# Patient Record
Sex: MALE | Race: BLACK OR AFRICAN AMERICAN | NOT HISPANIC OR LATINO | URBAN - METROPOLITAN AREA
[De-identification: names, ages, dates, MRNs, and addresses within clinical notes are randomized per-mention and may not be internally consistent; named-entity substitution may affect disease eponyms.]

---

## 2017-12-13 ENCOUNTER — EMERGENCY (EMERGENCY)
Facility: HOSPITAL | Age: 27
LOS: 1 days | Discharge: HOME | End: 2017-12-13
Attending: EMERGENCY MEDICINE | Admitting: EMERGENCY MEDICINE
Payer: COMMERCIAL

## 2017-12-13 VITALS
SYSTOLIC BLOOD PRESSURE: 121 MMHG | OXYGEN SATURATION: 100 % | RESPIRATION RATE: 16 BRPM | DIASTOLIC BLOOD PRESSURE: 77 MMHG | HEART RATE: 90 BPM | TEMPERATURE: 99 F

## 2017-12-13 PROCEDURE — 99283 EMERGENCY DEPT VISIT LOW MDM: CPT

## 2017-12-13 RX ORDER — TRAMADOL HYDROCHLORIDE 50 MG/1
1 TABLET ORAL
Qty: 9 | Refills: 0 | OUTPATIENT
Start: 2017-12-13

## 2017-12-13 RX ORDER — TRAMADOL HYDROCHLORIDE 50 MG/1
50 TABLET ORAL ONCE
Qty: 0 | Refills: 0 | Status: DISCONTINUED | OUTPATIENT
Start: 2017-12-13 | End: 2017-12-13

## 2017-12-13 RX ORDER — TRAMADOL HYDROCHLORIDE 50 MG/1
25 TABLET ORAL ONCE
Qty: 0 | Refills: 0 | Status: DISCONTINUED | OUTPATIENT
Start: 2017-12-13 | End: 2017-12-13

## 2017-12-13 RX ORDER — TRAMADOL HYDROCHLORIDE 50 MG/1
1 TABLET ORAL
Qty: 9 | Refills: 0 | OUTPATIENT
Start: 2017-12-13 | End: 2017-12-15

## 2017-12-13 RX ADMIN — TRAMADOL HYDROCHLORIDE 50 MILLIGRAM(S): 50 TABLET ORAL at 12:35

## 2017-12-13 NOTE — ED PROVIDER NOTE - LOWER EXTREMITY EXAM, RIGHT
mild tenderness to right calf, minimal discoloration indicating bruise, no obvious swelling mild tenderness to right calf, minimal discoloration indicating bruise, no obvious swelling. No erythema. No deformity. Right foot with no tenderness or deformity.

## 2017-12-13 NOTE — ED PROVIDER NOTE - CARE PLAN
Principal Discharge DX:	Musculoskeletal pain  Instructions for follow-up, activity and diet:	Follow up with your Doctor in 1-2 days.  Rest.  Ice 3-4 x a day.  Continue to take Naproxen as directed for pain take with food.  Ice to affected area 3-4 days.    Return to the ER for any persistent/worsening or new symptoms weakness, numbness or any concerning symptoms. Principal Discharge DX:	Musculoskeletal pain  Instructions for follow-up, activity and diet:	Follow up with your Doctor in 1-2 days.  Rest.  Ice 3-4 x a day.  Continue to take Naproxen as directed for pain take with food.  Take Tramadol 50mg orally every 8 hours as needed for pain don't drive or drink alcohol while taking this medication.  Ice to affected area 3-4 days.    Return to the ER for any persistent/worsening or new symptoms weakness, numbness or any concerning symptoms.

## 2017-12-13 NOTE — ED PROVIDER NOTE - PROGRESS NOTE DETAILS
DAIJA Choudhary:(QUID PA) pt ambulating without difficulty, no limp.  Pt requesting percocet RX advised receiving rx for Tramadol pt cursing and yelling, pt advised he is not receiving percocet,  discharged reviewed with patient.  D/W Dr Do. DAIJA Choudhary: pt escorted out by security.

## 2017-12-13 NOTE — ED ADULT TRIAGE NOTE - CHIEF COMPLAINT QUOTE
Pt c/o RLE pain since last night.  Was seen at [place] yesterday.  Amb well in triage.  Pt belligerent, uncooperative, and using foul language in triage.  Poor historian

## 2017-12-13 NOTE — ED PROVIDER NOTE - PLAN OF CARE
Follow up with your Doctor in 1-2 days.  Rest.  Ice 3-4 x a day.  Continue to take Naproxen as directed for pain take with food.  Ice to affected area 3-4 days.    Return to the ER for any persistent/worsening or new symptoms weakness, numbness or any concerning symptoms. Follow up with your Doctor in 1-2 days.  Rest.  Ice 3-4 x a day.  Continue to take Naproxen as directed for pain take with food.  Take Tramadol 50mg orally every 8 hours as needed for pain don't drive or drink alcohol while taking this medication.  Ice to affected area 3-4 days.    Return to the ER for any persistent/worsening or new symptoms weakness, numbness or any concerning symptoms.
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
## 2018-07-23 ENCOUNTER — EMERGENCY (EMERGENCY)
Facility: HOSPITAL | Age: 28
LOS: 1 days | Discharge: HOME | End: 2018-07-23
Attending: EMERGENCY MEDICINE | Admitting: EMERGENCY MEDICINE
Payer: SELF-PAY

## 2018-07-23 VITALS
DIASTOLIC BLOOD PRESSURE: 70 MMHG | RESPIRATION RATE: 16 BRPM | SYSTOLIC BLOOD PRESSURE: 104 MMHG | HEART RATE: 94 BPM | OXYGEN SATURATION: 100 % | TEMPERATURE: 98 F

## 2018-07-23 VITALS
SYSTOLIC BLOOD PRESSURE: 108 MMHG | OXYGEN SATURATION: 100 % | HEART RATE: 72 BPM | DIASTOLIC BLOOD PRESSURE: 56 MMHG | RESPIRATION RATE: 14 BRPM

## 2018-07-23 LAB
ALBUMIN SERPL ELPH-MCNC: 3.9 G/DL — SIGNIFICANT CHANGE UP (ref 3.3–5)
ALP SERPL-CCNC: 67 U/L — SIGNIFICANT CHANGE UP (ref 40–120)
ALT FLD-CCNC: 20 U/L — SIGNIFICANT CHANGE UP (ref 4–41)
AST SERPL-CCNC: 20 U/L — SIGNIFICANT CHANGE UP (ref 4–40)
B PERT DNA SPEC QL NAA+PROBE: SIGNIFICANT CHANGE UP
BASE EXCESS BLDV CALC-SCNC: 1 MMOL/L — SIGNIFICANT CHANGE UP
BASOPHILS # BLD AUTO: 0.03 K/UL — SIGNIFICANT CHANGE UP (ref 0–0.2)
BASOPHILS NFR BLD AUTO: 0.3 % — SIGNIFICANT CHANGE UP (ref 0–2)
BILIRUB SERPL-MCNC: 1 MG/DL — SIGNIFICANT CHANGE UP (ref 0.2–1.2)
BLOOD GAS VENOUS - CREATININE: 0.94 MG/DL — SIGNIFICANT CHANGE UP (ref 0.5–1.3)
BUN SERPL-MCNC: 12 MG/DL — SIGNIFICANT CHANGE UP (ref 7–23)
C PNEUM DNA SPEC QL NAA+PROBE: NOT DETECTED — SIGNIFICANT CHANGE UP
CALCIUM SERPL-MCNC: 8.9 MG/DL — SIGNIFICANT CHANGE UP (ref 8.4–10.5)
CHLORIDE BLDV-SCNC: 107 MMOL/L — SIGNIFICANT CHANGE UP (ref 96–108)
CHLORIDE SERPL-SCNC: 101 MMOL/L — SIGNIFICANT CHANGE UP (ref 98–107)
CK SERPL-CCNC: 534 U/L — HIGH (ref 30–200)
CK SERPL-CCNC: 684 U/L — HIGH (ref 30–200)
CO2 SERPL-SCNC: 25 MMOL/L — SIGNIFICANT CHANGE UP (ref 22–31)
CREAT SERPL-MCNC: 1 MG/DL — SIGNIFICANT CHANGE UP (ref 0.5–1.3)
CRP SERPL-MCNC: 57.9 MG/L — HIGH
EOSINOPHIL # BLD AUTO: 0.13 K/UL — SIGNIFICANT CHANGE UP (ref 0–0.5)
EOSINOPHIL NFR BLD AUTO: 1.3 % — SIGNIFICANT CHANGE UP (ref 0–6)
ERYTHROCYTE [SEDIMENTATION RATE] IN BLOOD: 23 MM/HR — HIGH (ref 1–15)
FLUAV H1 2009 PAND RNA SPEC QL NAA+PROBE: NOT DETECTED — SIGNIFICANT CHANGE UP
FLUAV H1 RNA SPEC QL NAA+PROBE: NOT DETECTED — SIGNIFICANT CHANGE UP
FLUAV H3 RNA SPEC QL NAA+PROBE: NOT DETECTED — SIGNIFICANT CHANGE UP
FLUAV SUBTYP SPEC NAA+PROBE: SIGNIFICANT CHANGE UP
FLUBV RNA SPEC QL NAA+PROBE: NOT DETECTED — SIGNIFICANT CHANGE UP
GAS PNL BLDV: 139 MMOL/L — SIGNIFICANT CHANGE UP (ref 136–146)
GLUCOSE BLDV-MCNC: 108 — HIGH (ref 70–99)
GLUCOSE SERPL-MCNC: 107 MG/DL — HIGH (ref 70–99)
HADV DNA SPEC QL NAA+PROBE: NOT DETECTED — SIGNIFICANT CHANGE UP
HBA1C BLD-MCNC: 5.8 % — HIGH (ref 4–5.6)
HCO3 BLDV-SCNC: 25 MMOL/L — SIGNIFICANT CHANGE UP (ref 20–27)
HCOV 229E RNA SPEC QL NAA+PROBE: NOT DETECTED — SIGNIFICANT CHANGE UP
HCOV HKU1 RNA SPEC QL NAA+PROBE: NOT DETECTED — SIGNIFICANT CHANGE UP
HCOV NL63 RNA SPEC QL NAA+PROBE: NOT DETECTED — SIGNIFICANT CHANGE UP
HCOV OC43 RNA SPEC QL NAA+PROBE: NOT DETECTED — SIGNIFICANT CHANGE UP
HCT VFR BLD CALC: 31.7 % — LOW (ref 39–50)
HCT VFR BLD CALC: 33.8 % — LOW (ref 39–50)
HCT VFR BLDV CALC: 35.6 % — LOW (ref 39–51)
HGB BLD-MCNC: 10.3 G/DL — LOW (ref 13–17)
HGB BLD-MCNC: 11.1 G/DL — LOW (ref 13–17)
HGB BLDV-MCNC: 11.5 G/DL — LOW (ref 13–17)
HMPV RNA SPEC QL NAA+PROBE: NOT DETECTED — SIGNIFICANT CHANGE UP
HPIV1 RNA SPEC QL NAA+PROBE: NOT DETECTED — SIGNIFICANT CHANGE UP
HPIV2 RNA SPEC QL NAA+PROBE: NOT DETECTED — SIGNIFICANT CHANGE UP
HPIV3 RNA SPEC QL NAA+PROBE: NOT DETECTED — SIGNIFICANT CHANGE UP
HPIV4 RNA SPEC QL NAA+PROBE: NOT DETECTED — SIGNIFICANT CHANGE UP
IMM GRANULOCYTES # BLD AUTO: 0.03 # — SIGNIFICANT CHANGE UP
IMM GRANULOCYTES NFR BLD AUTO: 0.3 % — SIGNIFICANT CHANGE UP (ref 0–1.5)
LACTATE BLDV-MCNC: 1.8 MMOL/L — SIGNIFICANT CHANGE UP (ref 0.5–2)
LDH SERPL L TO P-CCNC: 181 U/L — SIGNIFICANT CHANGE UP (ref 135–225)
LYMPHOCYTES # BLD AUTO: 2.27 K/UL — SIGNIFICANT CHANGE UP (ref 1–3.3)
LYMPHOCYTES # BLD AUTO: 23.5 % — SIGNIFICANT CHANGE UP (ref 13–44)
M PNEUMO DNA SPEC QL NAA+PROBE: NOT DETECTED — SIGNIFICANT CHANGE UP
MAGNESIUM SERPL-MCNC: 2 MG/DL — SIGNIFICANT CHANGE UP (ref 1.6–2.6)
MCHC RBC-ENTMCNC: 27.8 PG — SIGNIFICANT CHANGE UP (ref 27–34)
MCHC RBC-ENTMCNC: 28 PG — SIGNIFICANT CHANGE UP (ref 27–34)
MCHC RBC-ENTMCNC: 32.5 % — SIGNIFICANT CHANGE UP (ref 32–36)
MCHC RBC-ENTMCNC: 32.8 % — SIGNIFICANT CHANGE UP (ref 32–36)
MCV RBC AUTO: 84.7 FL — SIGNIFICANT CHANGE UP (ref 80–100)
MCV RBC AUTO: 86.1 FL — SIGNIFICANT CHANGE UP (ref 80–100)
MONOCYTES # BLD AUTO: 0.78 K/UL — SIGNIFICANT CHANGE UP (ref 0–0.9)
MONOCYTES NFR BLD AUTO: 8.1 % — SIGNIFICANT CHANGE UP (ref 2–14)
NEUTROPHILS # BLD AUTO: 6.44 K/UL — SIGNIFICANT CHANGE UP (ref 1.8–7.4)
NEUTROPHILS NFR BLD AUTO: 66.5 % — SIGNIFICANT CHANGE UP (ref 43–77)
NRBC # FLD: 0 — SIGNIFICANT CHANGE UP
NRBC # FLD: 0 — SIGNIFICANT CHANGE UP
PCO2 BLDV: 40 MMHG — LOW (ref 41–51)
PH BLDV: 7.42 PH — SIGNIFICANT CHANGE UP (ref 7.32–7.43)
PHOSPHATE SERPL-MCNC: 3.9 MG/DL — SIGNIFICANT CHANGE UP (ref 2.5–4.5)
PLATELET # BLD AUTO: 222 K/UL — SIGNIFICANT CHANGE UP (ref 150–400)
PLATELET # BLD AUTO: 252 K/UL — SIGNIFICANT CHANGE UP (ref 150–400)
PMV BLD: 10.5 FL — SIGNIFICANT CHANGE UP (ref 7–13)
PMV BLD: 10.5 FL — SIGNIFICANT CHANGE UP (ref 7–13)
PO2 BLDV: 80 MMHG — HIGH (ref 35–40)
POTASSIUM BLDV-SCNC: 3.3 MMOL/L — LOW (ref 3.4–4.5)
POTASSIUM SERPL-MCNC: 3.4 MMOL/L — LOW (ref 3.5–5.3)
POTASSIUM SERPL-SCNC: 3.4 MMOL/L — LOW (ref 3.5–5.3)
PROT SERPL-MCNC: 6.9 G/DL — SIGNIFICANT CHANGE UP (ref 6–8.3)
RBC # BLD: 3.68 M/UL — LOW (ref 4.2–5.8)
RBC # BLD: 3.99 M/UL — LOW (ref 4.2–5.8)
RBC # FLD: 15.4 % — HIGH (ref 10.3–14.5)
RBC # FLD: 15.5 % — HIGH (ref 10.3–14.5)
RSV RNA SPEC QL NAA+PROBE: NOT DETECTED — SIGNIFICANT CHANGE UP
RV+EV RNA SPEC QL NAA+PROBE: POSITIVE — HIGH
SAO2 % BLDV: 96.1 % — HIGH (ref 60–85)
SODIUM SERPL-SCNC: 140 MMOL/L — SIGNIFICANT CHANGE UP (ref 135–145)
WBC # BLD: 9.68 K/UL — SIGNIFICANT CHANGE UP (ref 3.8–10.5)
WBC # BLD: 9.87 K/UL — SIGNIFICANT CHANGE UP (ref 3.8–10.5)
WBC # FLD AUTO: 9.68 K/UL — SIGNIFICANT CHANGE UP (ref 3.8–10.5)
WBC # FLD AUTO: 9.87 K/UL — SIGNIFICANT CHANGE UP (ref 3.8–10.5)

## 2018-07-23 PROCEDURE — 73600 X-RAY EXAM OF ANKLE: CPT | Mod: 26,50

## 2018-07-23 PROCEDURE — 93308 TTE F-UP OR LMTD: CPT | Mod: 26

## 2018-07-23 PROCEDURE — 71046 X-RAY EXAM CHEST 2 VIEWS: CPT | Mod: 26

## 2018-07-23 PROCEDURE — 73552 X-RAY EXAM OF FEMUR 2/>: CPT | Mod: 26,LT

## 2018-07-23 PROCEDURE — 99235 HOSP IP/OBS SAME DATE MOD 70: CPT | Mod: 25

## 2018-07-23 PROCEDURE — 73620 X-RAY EXAM OF FOOT: CPT | Mod: 26,50

## 2018-07-23 PROCEDURE — 70450 CT HEAD/BRAIN W/O DYE: CPT | Mod: 26

## 2018-07-23 PROCEDURE — 99053 MED SERV 10PM-8AM 24 HR FAC: CPT

## 2018-07-23 PROCEDURE — 73590 X-RAY EXAM OF LOWER LEG: CPT | Mod: 26,50

## 2018-07-23 RX ORDER — SODIUM CHLORIDE 9 MG/ML
1000 INJECTION INTRAMUSCULAR; INTRAVENOUS; SUBCUTANEOUS ONCE
Qty: 0 | Refills: 0 | Status: COMPLETED | OUTPATIENT
Start: 2018-07-23 | End: 2018-07-23

## 2018-07-23 RX ORDER — IBUPROFEN 200 MG
600 TABLET ORAL ONCE
Qty: 0 | Refills: 0 | Status: COMPLETED | OUTPATIENT
Start: 2018-07-23 | End: 2018-07-23

## 2018-07-23 RX ORDER — KETOROLAC TROMETHAMINE 30 MG/ML
15 SYRINGE (ML) INJECTION ONCE
Qty: 0 | Refills: 0 | Status: DISCONTINUED | OUTPATIENT
Start: 2018-07-23 | End: 2018-07-23

## 2018-07-23 RX ORDER — ACETAMINOPHEN 500 MG
975 TABLET ORAL ONCE
Qty: 0 | Refills: 0 | Status: COMPLETED | OUTPATIENT
Start: 2018-07-23 | End: 2018-07-23

## 2018-07-23 RX ORDER — METOCLOPRAMIDE HCL 10 MG
10 TABLET ORAL ONCE
Qty: 0 | Refills: 0 | Status: COMPLETED | OUTPATIENT
Start: 2018-07-23 | End: 2018-07-23

## 2018-07-23 RX ADMIN — Medication 975 MILLIGRAM(S): at 09:00

## 2018-07-23 RX ADMIN — SODIUM CHLORIDE 2000 MILLILITER(S): 9 INJECTION INTRAMUSCULAR; INTRAVENOUS; SUBCUTANEOUS at 02:46

## 2018-07-23 RX ADMIN — SODIUM CHLORIDE 2000 MILLILITER(S): 9 INJECTION INTRAMUSCULAR; INTRAVENOUS; SUBCUTANEOUS at 04:00

## 2018-07-23 RX ADMIN — Medication 15 MILLIGRAM(S): at 15:35

## 2018-07-23 RX ADMIN — SODIUM CHLORIDE 2000 MILLILITER(S): 9 INJECTION INTRAMUSCULAR; INTRAVENOUS; SUBCUTANEOUS at 02:10

## 2018-07-23 RX ADMIN — Medication 15 MILLIGRAM(S): at 02:10

## 2018-07-23 RX ADMIN — Medication 10 MILLIGRAM(S): at 02:10

## 2018-07-23 RX ADMIN — Medication 600 MILLIGRAM(S): at 10:02

## 2018-07-23 RX ADMIN — Medication 975 MILLIGRAM(S): at 05:00

## 2018-07-23 RX ADMIN — Medication 15 MILLIGRAM(S): at 02:46
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
## 2018-07-23 NOTE — ED CDU PROVIDER INITIAL DAY NOTE - PROGRESS NOTE DETAILS
Patient sleeping comfortably in bed, NAD, No complaints. Will continue to monitor. Patient laying comfortably in bed NAD. No neuro deficits. Discussed with attending, patient can be discharged home to f/u with PCP. The patient was given verbal and written discharge instructions. Specifically, instructions when to return to the ED and when to seek follow-up from their pcp was discussed. Any specialty follow-up was discussed, including how to make an appointment.  Instructions were discussed in simple, plain language and was understood by the patient. The patient understands that should their symptoms worsen or any new symptoms arise, they should return to the ED immediately for further evaluation. All pt's questions were answered. Patient verbalizes understanding. Patient laying comfortably in bed NAD. No neuro deficits. Pt tolerating PO intake without n/v. Pt ambulatory to the rest room without any difficulty. Discussed with attending, patient can be discharged home to f/u with PCP. The patient was given verbal and written discharge instructions. Specifically, instructions when to return to the ED and when to seek follow-up from their pcp was discussed. Any specialty follow-up was discussed, including how to make an appointment.  Instructions were discussed in simple, plain language and was understood by the patient. The patient understands that should their symptoms worsen or any new symptoms arise, they should return to the ED immediately for further evaluation. All pt's questions were answered. Patient verbalizes understanding.

## 2018-07-23 NOTE — ED ADULT NURSE REASSESSMENT NOTE - NS ED NURSE REASSESS COMMENT FT1
Pt. admitted to CDU but PA requested CT scan before patient comes over to CDU.  Scan ordered. Pt. stable. no acute distress noted. Pt. with a small 1 inch lac noted to back of R ear. will continue to monitor. ST

## 2018-07-23 NOTE — ED PROCEDURE NOTE - PROCEDURE ADDITIONAL DETAILS
normal LV size and function  normal RV free wall velocity,  normal wall thickness measured in PSLAX   normal flow in LVOT  AV appears grossly normal  no pericardial effusion  IVC 1.99cm after hydration

## 2018-07-23 NOTE — ED ADULT NURSE REASSESSMENT NOTE - NS ED NURSE REASSESS COMMENT FT1
[facility]. stable no acute distress noted at this time.

## 2018-07-23 NOTE — ED ADULT NURSE REASSESSMENT NOTE - NS ED NURSE REASSESS COMMENT FT1
Pt. Alert and responsive but confused. CM to NSR. vitals stable s/p conscious sedation. admitted to medicine. will continue to monitor. ST

## 2018-07-23 NOTE — ED ADULT NURSE NOTE - OBJECTIVE STATEMENT
Pt. received into room # 9, A&O x3 with c/o headache and body ache. s/p fall onto train tracts yesterday after passing out.  no acute distress noted at this time. will continue to monitor. ST

## 2018-07-23 NOTE — ED CDU PROVIDER INITIAL DAY NOTE - MUSCULOSKELETAL MINIMAL EXAM
neck supple/mild swelling to left anterior tibialis, no bleeding, no eccymosis, mildly tender to palpation, no obvious deformity; mild eccymosis noted to b/l laterial thighs, mild tenderness, no erythema, no edema, no obvious deformity./atraumatic/normal range of motion/motor intact

## 2018-07-23 NOTE — ED CDU PROVIDER INITIAL DAY NOTE - ENMT, MLM
Airway patent. Nasal mucosa clear. Mouth with normal mucosa. Throat has no vesicles, no oropharyngeal exudates and uvula is midline. Airway patent. Nasal mucosa clear. Mouth with normal mucosa. Throat has no vesicles, no oropharyngeal exudates and uvula is midline. TM intact b/l, no hemotympanum b/l.

## 2018-07-23 NOTE — ED CDU PROVIDER INITIAL DAY NOTE - PHYSICAL EXAMINATION
Head: NC AT; tenderness to right posterior auricular area, slight abrasion, no bleeding, no swelling

## 2018-07-23 NOTE — ED PROVIDER NOTE - MEDICAL DECISION MAKING DETAILS
27M presenting s/p syncope. Will obtain labs, cxr, ekg, reassess. 27M presenting s/p syncope. Likely vasovagal. Low suspicion for ACS, with no cp, no risk factors. PERC negative. No focal deficits on exam. Will obtain labs, cxr, ekg, reassess.

## 2018-07-23 NOTE — ED CDU PROVIDER INITIAL DAY NOTE - OBJECTIVE STATEMENT
27M with no PMH presenting with episode of syncope yesterday. States that he was heading home from work as a  in the [employer], when he [place]opized at the train station. States a passerby helped him up, patient was asymptomatic and went home. Symptoms of mild headache, drowsiness, and mylagias began today midmorning. Patient was able to go to work however, left early due to symptoms and presented to ED. Denies any prodromal symptoms. Endorses that he did not eat or drink anything all day while at work and has been having some diarrhea for a few days. No chemical exposures at work other than occasionally mopping the floor. No prior similar symptoms. Denies fever, chills, cp, sob, n/v/d, dizziness, headache, dysuria 27 M with no PMH presenting with episode of syncope yesterday. States that he was heading home from work as a  in the [employer], when he syncopized at the train station. States a passerby helped him up, patient was asymptomatic and went home. Symptoms of mild headache, drowsiness, and mylagias began today midmorning. Patient was able to go to work however, left early due to symptoms and presented to ED. Denies any prodromal symptoms. Endorses that he did not eat or drink anything all day while at work and has been having some diarrhea for a few days. No chemical exposures at work other than occasionally mopping the floor. No prior similar symptoms. Denies fever, chills, cp, sob, n/v/d, dizziness, headache, dysuria    DAIJA Martinez: agrees with above. 27 M with no PMH presenting with episode of syncope yesterday. States that he was heading home from work as a  in the [employer], when he syncopized at the train station. States a passerby helped him up, patient was asymptomatic and went home. Symptoms of mild headache, drowsiness, and mylagias began today midmorning. Patient was able to go to work however, left early due to symptoms and presented to ED. Denies any prodromal symptoms. Endorses that he did not eat or drink anything all day while at work and has been having some diarrhea for a few days. No chemical exposures at work other than occasionally mopping the floor. No prior similar symptoms. Denies fever, chills, cp, sob, n/v/d, dizziness, headache, dysuria    PA Michelle: agrees with above. 28 yo M without any significant PMH, p/w witnessed syncope yesterday. Reports he was going home, standing at the platform waiting for the train, suddenly pass out, no prodromal symptoms prior to fall. States having mild right side headache, non-radiating. Reports body aches, mostly to b/l lower extremities. Admits cough x1 week, non-productive. Reports he worked overnight and he didn't eat much, likely dehydrated as well. Denies any fever, chills, dizziness, visual disturbances, neck pain, n/v/, chest pain, sob, abdominal pain, numbness, weakness or any other complaints.

## 2018-07-23 NOTE — ED PROVIDER NOTE - ATTENDING CONTRIBUTION TO CARE
Locurto  pt here c/o LE pain  which began today  Has been having respiratory congestion and diarrhea for the past few days and had a syncopal episode yesterday  preceeded by dizziness  he denies recent travel or known sick contacts    exam  pt awake alert  clear lungs  resp  nonlabored  card [id number]  no RMG  abd  nontender  moves all ext well nl distal pulses  able to range ankles  knees  hips   mild tenderness lt thigh  variably in calves    EKG  nondiagnostic

## 2018-07-23 NOTE — ED CDU PROVIDER DISPOSITION NOTE - PLAN OF CARE
Rest, drink plenty of fluids.  Advance activity as tolerated.  Continue all previously prescribed medications as directed.  Follow up with your primary care physician in 48-72 hours- bring copies of your results.  Take Tylenol 650mg (Two 325 mg pills) every 4-6 hours as needed for pain. Take Motrin 600 mg every 8 hours as needed for moderate pain -- take with food. Return to the ER for worsening or persistent symptoms, and/or ANY NEW OR CONCERNING SYMPTOMS. If you have issues obtaining follow up, please call: [phone] to obtain a doctor or specialist who takes your insurance in your area.

## 2018-07-23 NOTE — ED CDU PROVIDER INITIAL DAY NOTE - MEDICAL DECISION MAKING DETAILS
27 M with no PMH presenting with episode of syncope yesterday.  In ER CT head neg acute finding, hypotensive after 3 L NS, sent to CDU for 6hrs observation. 28 yo M with no significant PMH presenting with one episode of syncope yesterday.  -well appearing male, AAOx3 in NAD, s/p syncope yesterday.  In ER CT head neg acute finding, +RVP, Neg xray femor/tibia/fibula/ankle/foot, CXR neg, hypotensive after 3 L NS, bedside echo by Dr. Leyva without acute finding, sent to CDU for 6hrs observation.

## 2018-07-23 NOTE — ED PROVIDER NOTE - OBJECTIVE STATEMENT
27M with no PMH presenting with episode of syncope yesterday. States that he was heading home from work as a  [facility] the [employer], when he syncop[place] at the train station. States a passerby helped him up, patient was asymptomatic and went home. Symptoms of mild headache, drowsiness, and mylagias began today midmorning. Patient was able to go to work however, left early due to symptoms and presented to ED. Denies any prodromal symptoms. Endorses that he did not eat or drink anything all day while at work. No chemical exposures at work other than occasionally mopping the floor. No prior similar symptoms. Denies fever, chills, cp, sob, n/v/d, dizziness, headache, dysuria 27M with no PMH presenting with episode of syncope yesterday. States that he was heading home from work as a  in the [employer], when he syncopized at the train station. States a passerby helped him up, patient was asymptomatic and went home. Symptoms of mild headache, drowsiness, and mylagias began today midmorning. Patient was able to go to work however, left early due to symptoms and presented to ED. Denies any prodromal symptoms. Endorses that he did not eat or drink anything all day while at work and has been having some diarrhea for a few days. No chemical exposures at work other than occasionally mopping the floor. No prior similar symptoms. Denies fever, chills, cp, sob, n/v/d, dizziness, headache, dysuria

## 2018-07-23 NOTE — ED CDU PROVIDER DISPOSITION NOTE - CLINICAL COURSE
26 yo M without any significant PMH, p/w witnessed syncope yesterday which resulted in him falling onto a train platform. Pt had CT head and xray performed all of which were negative prior to coming to the CDU but was sent for observation as he was having mild right side headache, non-radiating and well as body aches to b/l lower extremities. Found to be enterovirus positive. pain control and hydration was provided for a slightly elevated CK; will d/c to follow with pmd.

## 2018-07-23 NOTE — ED PROVIDER NOTE - PROGRESS NOTE DETAILS
Jada: patient stating symptoms improved. Patient asking for food, stating he is hungry. Tolerating PO intake.

## 2018-07-24 LAB
SPECIMEN SOURCE: SIGNIFICANT CHANGE UP
SPECIMEN SOURCE: SIGNIFICANT CHANGE UP

## 2018-07-28 LAB
BACTERIA BLD CULT: SIGNIFICANT CHANGE UP
BACTERIA BLD CULT: SIGNIFICANT CHANGE UP

## 2022-03-14 NOTE — ED PROVIDER NOTE - CHPI ED SYMPTOMS POS
Department of Orthopedic Surgery  Resident Progress Note    Patient seen and examined. Pain well controlled. Denies numbness, tingling, paresthesias to either lower extremity. No new complaints. VITALS:  /64   Pulse 101   Temp 98 °F (36.7 °C) (Temporal)   Resp 20   Ht 5' 8\" (1.727 m)   Wt (!) 312 lb 11.2 oz (141.8 kg)   SpO2 96%   BMI 47.55 kg/m²     General: in no acute distress and alert    MUSCULOSKELETAL:   bilateral lower extremity:  · Dressing C/D/I  · Compartments soft and compressible  · Calf is soft and nontender  · +PF/DF/EHL  · +2/4 DP & PT pulses, Brisk Cap refill, Toes warm and perfused  · Distal sensation grossly intact to Peroneals, Sural, Saphenous, and tibial nrs    CBC:   Lab Results   Component Value Date    WBC 14.9 03/14/2022    HGB 9.5 03/14/2022    HCT 31.6 03/14/2022     03/14/2022     PT/INR:    Lab Results   Component Value Date    PROTIME 12.1 03/10/2022    INR 1.1 03/10/2022       ASSESSMENT  · S/P ORIF of symphysis pubis dislocation, percutaneous screw fixation of left and then right sacroiliac joint with iliosacral screws- 2/8/22  · Right scapular body fracture  · Right scapular coracoid fracture    PLAN      · Continue physical therapy and protocol: Weightbearing as tolerated right lower extremity, partial weightbearing left lower extremity for transfers only. Nonweightbearing right upper extremity  · Sling to right upper extremity for comfort  · Deep venous thrombosis prophylaxis -per admitting recommendations, okay to resume, early mobilization  · PT/OT  · Pain Control: IV and PO  · Monitor H&H, asymptomatic  · D/C Plan:  Ok to discharge from orthopedic standpoint once patient is stable and appropriate discharge plan is in place. Orthopedics will follow the patient peripherally for the remainder of their inpatient stay. Please call with questions or concerns.     Electronically signed by Ebenezer Masters DO on 3/14/2022 at 7:29 AM RLE pain and tingling